# Patient Record
Sex: MALE | Race: WHITE | NOT HISPANIC OR LATINO | URBAN - METROPOLITAN AREA
[De-identification: names, ages, dates, MRNs, and addresses within clinical notes are randomized per-mention and may not be internally consistent; named-entity substitution may affect disease eponyms.]

---

## 2021-07-18 ENCOUNTER — EMERGENCY (EMERGENCY)
Facility: HOSPITAL | Age: 66
LOS: 1 days | Discharge: HOME | End: 2021-07-18
Attending: EMERGENCY MEDICINE | Admitting: EMERGENCY MEDICINE
Payer: COMMERCIAL

## 2021-07-18 VITALS
TEMPERATURE: 98 F | DIASTOLIC BLOOD PRESSURE: 83 MMHG | WEIGHT: 179.9 LBS | OXYGEN SATURATION: 98 % | RESPIRATION RATE: 18 BRPM | SYSTOLIC BLOOD PRESSURE: 131 MMHG | HEIGHT: 69 IN | HEART RATE: 97 BPM

## 2021-07-18 VITALS
OXYGEN SATURATION: 97 % | SYSTOLIC BLOOD PRESSURE: 128 MMHG | TEMPERATURE: 99 F | RESPIRATION RATE: 16 BRPM | HEART RATE: 90 BPM | DIASTOLIC BLOOD PRESSURE: 80 MMHG

## 2021-07-18 PROCEDURE — 99282 EMERGENCY DEPT VISIT SF MDM: CPT

## 2021-07-18 NOTE — ED ADULT NURSE NOTE - NS ED NURSE LEVEL OF CONSCIOUSNESS SPEECH
[facility]     CC: Hematemesis    HPI 48 y.o. male with hematemesis, one episode, onset yesterday, bright red, scant amount, associated with epigastric pain and N/V which had been going on for 5 days and was sharp in quality, exacerbated by eating.  He has had some SOB.  He has an extensive cardiac history with severe CHF and AICD/pacemaker in place.  He denies history of EGD in the past. He admits to GERD and is not on PPI.  Denies NSAIDs.  He admits to some intermittent dysphagia.  He had a colonoscopy one year ago in [place] which was normal.  His sister had colon cancer at age 28.  No other acute complaints.  Per notes by Dr. Velasquez, no hematemesis observed in the ER.    Past Medical History:   Diagnosis Date    Coronary artery disease     Hypertension     MI (myocardial infarction)        Past Surgical History:   Procedure Laterality Date    APPENDECTOMY      CHOLECYSTECTOMY      pacer defibrillator         Social History   Substance Use Topics    Smoking status: Former Smoker    Smokeless tobacco: Not on file    Alcohol use Yes      Comment:  once and awhile       Family History   Problem Relation Age of Onset    Hypertension Mother     Heart disease Father     Hypertension Father     Cancer Sister     Diabetes Brother        Review of Systems  General ROS: negative for - chills, fever or weight loss  Psychological ROS: negative for - hallucination, depression or suicidal ideation  Ophthalmic ROS: negative for - blurry vision, photophobia or eye pain  ENT ROS: negative for - epistaxis, sore throat or rhinorrhea  Respiratory ROS: no cough, shortness of breath, or wheezing  Cardiovascular ROS: no chest pain or dyspnea on exertion  Gastrointestinal ROS: + epigastric pain and hematemesis  Genito-Urinary ROS: no dysuria, trouble voiding, or hematuria  Musculoskeletal ROS: negative for - arthralgia, myalgia, weakness  Neurological ROS: no syncope or seizures; no  Speaking Coherently "ataxia  Dermatological ROS: negative for pruritis, rash and jaundice    Physical Examination  BP 90/61   Pulse 76   Temp 98.7 °F (37.1 °C) (Oral)   Resp (!) 22   Ht 6' 1" (1.854 m)   Wt 89.4 kg (197 lb)   SpO2 97%   BMI 25.99 kg/m²   General appearance: alert, cooperative, no distress  HENT: Normocephalic, atraumatic, neck symmetrical, no nasal discharge   Eyes: conjunctivae/corneas clear, PERRL, EOM's intact, sclera anicteric  Lungs: clear to auscultation bilaterally, no dullness to percussion bilaterally, symmetric expansion, breathing unlabored  Heart: regular rate and rhythm without rub; no displacement of the PMI   Abdomen: TTP in epigastrum, + BS  Extremities: extremities symmetric; no clubbing, cyanosis, or edema  Integument: Skin color, texture, turgor normal; no rashes; hair distrubution normal, no jaundice  Neurologic: Alert and oriented X 3, no focal sensory or motor neurologic deficits  Psychiatric: no pressured speech; normal affect; no evidence of impaired cognition, no anxiety/depression     Labs:  Lab Results   Component Value Date    WBC 12.00 06/12/2018    HGB 12.8 (L) 06/12/2018    HGB 12.8 (L) 06/12/2018    HCT 39.4 (L) 06/12/2018    HCT 39.4 (L) 06/12/2018    MCV 95 06/12/2018     06/12/2018     CMP  Sodium   Date Value Ref Range Status   06/12/2018 132 (L) 136 - 145 mmol/L Final     Potassium   Date Value Ref Range Status   06/12/2018 4.2 3.5 - 5.1 mmol/L Final     Chloride   Date Value Ref Range Status   06/12/2018 97 95 - 110 mmol/L Final     CO2   Date Value Ref Range Status   06/12/2018 26 23 - 29 mmol/L Final     Glucose   Date Value Ref Range Status   06/12/2018 111 (H) 70 - 110 mg/dL Final     BUN, Bld   Date Value Ref Range Status   06/12/2018 17 6 - 20 mg/dL Final     Creatinine   Date Value Ref Range Status   06/12/2018 1.2 0.5 - 1.4 mg/dL Final     Calcium   Date Value Ref Range Status   06/12/2018 8.6 (L) 8.7 - 10.5 mg/dL Final     Total Protein   Date Value Ref Range " Status   06/12/2018 5.9 (L) 6.0 - 8.4 g/dL Final     Albumin   Date Value Ref Range Status   06/12/2018 2.9 (L) 3.5 - 5.2 g/dL Final     Total Bilirubin   Date Value Ref Range Status   06/12/2018 1.4 (H) 0.1 - 1.0 mg/dL Final     Comment:     For infants and newborns, interpretation of results should be based  on gestational age, weight and in agreement with clinical  observations.  Premature Infant recommended reference ranges:  Up to 24 hours.............<8.0 mg/dL  Up to 48 hours............<12.0 mg/dL  3-5 days..................<15.0 mg/dL  6-29 days.................<15.0 mg/dL       Alkaline Phosphatase   Date Value Ref Range Status   06/12/2018 57 55 - 135 U/L Final     AST   Date Value Ref Range Status   06/12/2018 27 10 - 40 U/L Final     ALT   Date Value Ref Range Status   06/12/2018 61 (H) 10 - 44 U/L Final     Anion Gap   Date Value Ref Range Status   06/12/2018 9 8 - 16 mmol/L Final     eGFR if    Date Value Ref Range Status   06/12/2018 >60 >60 mL/min/1.73 m^2 Final     eGFR if non    Date Value Ref Range Status   06/12/2018 >60 >60 mL/min/1.73 m^2 Final     Comment:     Calculation used to obtain the estimated glomerular filtration  rate (eGFR) is the CKD-EPI equation.            Imaging:  CXR was independently visualized and reviewed by me and showed CHF and pacer in place.      I have personally reviewed these images    Old records from Dr. Velasquez reviewed and are as summarized above in the HPI.      Assessment:   1.  Epigastric pain  2.  Hematemesis  3.  CHF  4.  Multiple medical problems    Plan:  1.  IV PPI  2.  NPO  3.  Monitor H/H  4.  Symptomatic control for nausea  5.  Urgent EGD  6.  Further recommendations to follow after above.  7.  Communication will be sent to the referring MD, Dr. Koroma regarding my assessment and plan on this patient via EPIC.      Quan Lu MD  [facility]  [address], [address]  [place]  [address]  Office: [phone]  Fax: [phone]

## 2021-07-18 NOTE — ED PROVIDER NOTE - NSFOLLOWUPINSTRUCTIONS_ED_ALL_ED_FT
Follow up with your hand surgeon tomorrow as scheduled for your surgery, Keep your hand elevated in sling as directed.        WHAT YOU NEED TO KNOW:    A contusion is a bruise that appears on your skin after an injury. A bruise happens when small blood vessels tear but skin does not. Blood leaks into nearby tissue, such as soft tissue or muscle.    DISCHARGE INSTRUCTIONS:    Return to the emergency department if:   •You have new trouble moving the injured area.      •You have tingling or numbness in or near the injured area.      •Your hand or foot below the bruise gets cold or turns pale.       Call your doctor if:   •You find a new lump in the injured area.      •Your symptoms do not improve with treatment after 4 to 5 days.      •You have questions or concerns about your condition or care.      Medicines: You may need any of the following:   •NSAIDs help decrease swelling and pain or fever. This medicine is available with or without a doctor's order. NSAIDs can cause stomach bleeding or kidney problems in certain people. If you take blood thinner medicine, always ask your healthcare provider if NSAIDs are safe for you. Always read the medicine label and follow directions.      •Prescription pain medicine may be given. Ask your healthcare provider how to take this medicine safely. Some prescription pain medicines contain acetaminophen. Do not take other medicines that contain acetaminophen without talking to your healthcare provider. Too much acetaminophen may cause liver damage. Prescription pain medicine may cause constipation. Ask your healthcare provider how to prevent or treat constipation.       •Take your medicine as directed. Contact your healthcare provider if you think your medicine is not helping or if you have side effects. Tell him of her if you are allergic to any medicine. Keep a list of the medicines, vitamins, and herbs you take. Include the amounts, and when and why you take them. Bring the list or the pill bottles to follow-up visits. Carry your medicine list with you in case of an emergency.      Help a contusion heal:   •Rest the injured area or use it less than usual. If you bruised your leg or foot, you may need crutches or a cane to help you walk. This will help you keep weight off your injured body part.       •Apply ice to decrease swelling and pain. Ice may also help prevent tissue damage. Use an ice pack, or put crushed ice in a plastic bag. Cover it with a towel and place it on your bruise for 15 to 20 minutes every hour or as directed.      •Use compression to support the area and decrease swelling. Wrap an elastic bandage around the area over the bruised muscle. Make sure the bandage is not too tight. You should be able to fit 1 finger between the bandage and your skin.      •Elevate (raise) your injured body part above the level of your heart to help decrease pain and swelling. Use pillows, blankets, or rolled towels to elevate the area as often as you can.      •Do not drink alcohol as directed. Alcohol may slow healing.      •Do not stretch injured muscles right after your injury. Ask your healthcare provider when and how you may safely stretch after your injury. Gentle stretches can help increase your flexibility.      •Do not massage the area or put heating pads on the bruise right after your injury. Heat and massage may slow healing. Your healthcare provider may tell you to apply heat after several days. At that time, heat will start to help the injury heal.      Prevent another contusion:   •Stretch and warm up before you play sports or exercise.      •Wear protective gear when you play sports. Examples are shin guards and padding.       •If you begin a new physical activity, start slowly to give your body a chance to adjust.      Follow up with your doctor as directed: Write down your questions so you remember to ask them during your visits.       © Copyright [employer] 2021           back to top                          © Copyright [employer] 2021

## 2021-07-18 NOTE — ED ADULT NURSE NOTE - OBJECTIVE STATEMENT
pt aox4, " fell a week ago, FX rt wrist, cast on place, 2nd, 3rd finger brushing" FROM, good cap refill

## 2021-07-18 NOTE — ED PROVIDER NOTE - PHYSICAL EXAMINATION
right hand in ulnar gutter, removed, able to move fingers, cap refill < 2 sec, noted eccymosis of distal 2nd and 3rd digits, no pain, no signs of cellulitis

## 2021-07-18 NOTE — ED PROVIDER NOTE - CLINICAL SUMMARY MEDICAL DECISION MAKING FREE TEXT BOX
right 2nd and 3rd digit, eccymosis, no pain, good cap refill, able to move fingers, no weakness, no numbness, to reassure and f/u tomrrow

## 2021-07-18 NOTE — ED ADULT NURSE NOTE - CHPI ED NUR RELIEVING FX
[phone] ([phone])  ? Roberto Johnson, MS, [facility] [phone]  ? Camilla Mckeon, MS, [facility]  [phone]  ? Kassie Sánchez, MS, [facility]  [phone]  ? Tracy Landeros, MS, [facility] [phone]  ? Yany Geno, MS, [facility] [phone]  ? Rena Guerrero, MS, [facility]  [phone]  ? Marta Young, MS, [facility]  [phone]    References  1. Michelle PIERRE, Zahira PDP, Michael S, Rodney PATTEN, Joelle JE, Laverne JL, Isaac N, Silvestre H, Amilcar O, Larisa A, Yobaniini B, Radikristine P, Manjasmynkijason S, Que DM, Loredo N, Carolynn E, Haseeb H, Devin E, Vicki J, Gronbay J, Gauri B, Genetus H, Thorlacius S, Eerola H, Nevjasonlinna H, Herber K, Kade Yonathan YC, Vernell S, Argenis G, Shelton S. Breast cancer risk among male BRCA1 and BRCA2 mutation carriers. J Natl Cancer Inst. 2007;99:1811-4.  4. Miguel Ángel GONZALES, Kelvin I, Nilson J, Rosalia E, Mihir ER, Aleshia Arun BOLANOS, Carl J, Megan J, Sarah LA, Rodrigo GERMAN, Moni Garrison A, Derik D, Odalis S, Loan P, Barbra T, Jennifer M, Omar B, Rui H, Avel R, Foreign K, Susi L, Miguel Ángel DG, Que D, Bradley DF, Geena MR, The Breast Cancer Susceptibility Collaboration ([place]) & Karime MOYA. MAE mutations that cause ataxia-telangiectasia are breast cancer susceptibility alleles. Nature Genetics. 2006;38:873-875  9. Toribio N , Saba Y, Kiki J, Jessica L, Leticia GM , Jenelle ML, Gallinger S, Chamberlain AG, Syngal S, Marilia ML, Stewart J , Jeremy R, Davina SZ, Daisy JR, Boom VE, Wayne M, Vocitlali B, Julieta N, Shasha RH, Juan Jose KW, and Lea AP. MAE Afia, 74 (2004), pp. 6513-6803  12. Juan Antonio T, Kenzie S, Isra K, et al. Spectrum of Mutations in BRCA1, BRCA2, CHEK2, and TP53 in Families at High Risk of Breast Cancer. LOW. 2006;295(12):7535-9678.   13. Angel C, Nadine D, Seema A, [phone].  14. Alonso H, Lincoln E, Eduin SJ, none

## 2021-07-18 NOTE — ED ADULT TRIAGE NOTE - CHIEF COMPLAINT QUOTE
R hand injury one week ago.  Scheduled for OR to same tomorrow with hand surgeon.  Pt here due to increasing bruising to fingers.

## 2021-07-18 NOTE — ED PROVIDER NOTE - CARE PLAN
Principal Discharge DX:	Contusion of finger without damage to nail, unspecified finger, unspecified laterality, initial encounter

## 2021-07-18 NOTE — ED PROVIDER NOTE - PRINCIPAL DIAGNOSIS
Contusion of finger without damage to nail, unspecified finger, unspecified laterality, initial encounter

## 2021-07-18 NOTE — ED PROVIDER NOTE - PATIENT PORTAL LINK FT
You can access the FollowMyHealth Patient Portal offered by [facility] by registering at the following website: [email or web address]. By joining [facility]’s FollowMyHealth portal, you will also be able to view your health information using other applications (apps) compatible with our system.

## 2021-07-18 NOTE — ED PROVIDER NOTE - OBJECTIVE STATEMENT
66 male presents to ER states he has discoloration of right 2nd and 3rd digit, states one week ago he fell while playing pickle ball and fractured his right hand, 5th finger and metacarpal, has seen a hand specialist and placed in ulna gutter and sling and is due for surgery tomorrow.

## 2022-04-06 NOTE — ED PROVIDER NOTE - CROS ED ROS STATEMENT
all other ROS negative except as per HPI Humira Counseling:  I discussed with the patient the risks of adalimumab including but not limited to myelosuppression, immunosuppression, autoimmune hepatitis, demyelinating diseases, lymphoma, and serious infections.  The patient understands that monitoring is required including a PPD at baseline and must alert us or the primary physician if symptoms of infection or other concerning signs are noted.